# Patient Record
Sex: MALE | Race: OTHER | HISPANIC OR LATINO | Employment: FULL TIME | URBAN - METROPOLITAN AREA
[De-identification: names, ages, dates, MRNs, and addresses within clinical notes are randomized per-mention and may not be internally consistent; named-entity substitution may affect disease eponyms.]

---

## 2024-12-17 ENCOUNTER — APPOINTMENT (OUTPATIENT)
Dept: URGENT CARE | Age: 59
End: 2024-12-17
Payer: COMMERCIAL

## 2024-12-17 ENCOUNTER — OFFICE VISIT (OUTPATIENT)
Dept: URGENT CARE | Age: 59
End: 2024-12-17
Payer: COMMERCIAL

## 2024-12-17 VITALS
OXYGEN SATURATION: 99 % | WEIGHT: 169 LBS | RESPIRATION RATE: 16 BRPM | SYSTOLIC BLOOD PRESSURE: 131 MMHG | DIASTOLIC BLOOD PRESSURE: 93 MMHG | BODY MASS INDEX: 28.12 KG/M2 | TEMPERATURE: 97.8 F | HEART RATE: 80 BPM

## 2024-12-17 DIAGNOSIS — R11.2 NAUSEA AND VOMITING, UNSPECIFIED VOMITING TYPE: Primary | ICD-10-CM

## 2024-12-17 PROCEDURE — 99213 OFFICE O/P EST LOW 20 MIN: CPT | Performed by: STUDENT IN AN ORGANIZED HEALTH CARE EDUCATION/TRAINING PROGRAM

## 2024-12-17 RX ORDER — ONDANSETRON 4 MG/1
8 TABLET, FILM COATED ORAL EVERY 8 HOURS PRN
Qty: 15 TABLET | Refills: 0 | Status: SHIPPED | OUTPATIENT
Start: 2024-12-17 | End: 2024-12-22

## 2024-12-17 ASSESSMENT — ENCOUNTER SYMPTOMS
DIARRHEA: 0
CONSTITUTIONAL NEGATIVE: 1
NAUSEA: 1
CARDIOVASCULAR NEGATIVE: 1
RESPIRATORY NEGATIVE: 1
VOMITING: 1

## 2024-12-17 ASSESSMENT — PAIN SCALES - GENERAL: PAINLEVEL_OUTOF10: 6

## 2024-12-17 NOTE — PROGRESS NOTES
Subjective   Patient ID: Jamel Harrison is a 58 y.o. male. They present today with a chief complaint of Vomiting (N/V X 3 days. ).    History of Present Illness    Vomiting  Associated symptoms: no diarrhea      Patient presents to urgent care for nausea and vomiting over the last 3 days, no recorded fever, no report of diarrhea.  Multiple family members with similar symptoms, no report of blood in emesis, declined COVID and flu testing    past Medical History  Allergies as of 12/17/2024    (No Known Allergies)       (Not in a hospital admission)       History reviewed. No pertinent past medical history.    History reviewed. No pertinent surgical history.         Review of Systems  Review of Systems   Constitutional: Negative.    HENT: Negative.     Respiratory: Negative.     Cardiovascular: Negative.    Gastrointestinal:  Positive for nausea and vomiting. Negative for diarrhea.                                  Objective    Vitals:    12/17/24 1119   BP: (!) 131/93   Pulse: 80   Resp: 16   Temp: 36.6 °C (97.8 °F)   SpO2: 99%   Weight: 76.7 kg (169 lb)     No LMP for male patient.    Physical Exam  Vitals and nursing note reviewed.   Constitutional:       General: He is not in acute distress.     Appearance: Normal appearance. He is not ill-appearing, toxic-appearing or diaphoretic.   Cardiovascular:      Rate and Rhythm: Normal rate and regular rhythm.      Pulses: Normal pulses.      Heart sounds: Normal heart sounds.   Pulmonary:      Effort: Pulmonary effort is normal.      Breath sounds: Normal breath sounds.   Abdominal:      General: Abdomen is flat. Bowel sounds are normal. There is no distension.      Palpations: Abdomen is soft. There is no mass.      Tenderness: There is no abdominal tenderness. There is no guarding or rebound.      Hernia: No hernia is present.   Skin:     Findings: No rash.   Neurological:      General: No focal deficit present.      Mental Status: He is alert and oriented to person,  place, and time.   Psychiatric:         Mood and Affect: Mood normal.         Behavior: Behavior normal.         Procedures    Point of Care Test & Imaging Results from this visit  No results found for this visit on 12/17/24.   No results found.    Diagnostic study results (if any) were reviewed by Trevon Sutherland PA-C.    Assessment/Plan   Allergies, medications, history, and pertinent labs/EKGs/Imaging reviewed by Trevon Sutherland PA-C.     Medical Decision Making  I did discuss with patient may be viral etiology or possible food poisoning as reports that everyone in the home who has symptoms also ate [employer] chicken.  Will place on Zofran for symptomology, if worsening symptoms patient is to go to the ED.  Patient given note for work    Orders and Diagnoses  Diagnoses and all orders for this visit:  Nausea and vomiting, unspecified vomiting type  -     ondansetron (Zofran) 4 mg tablet; Take 2 tablets (8 mg) by mouth every 8 hours if needed for nausea or vomiting for up to 5 days.      Medical Admin Record      Patient disposition: Home please take medication as prescribed.  If no resolution regression of symptoms in 48 hours or if worsening symptoms or unable to keep in fluids please go to the emergency room    Electronically signed by Trevon Sutherland PA-C  11:32 AM

## 2024-12-17 NOTE — PATIENT INSTRUCTIONS
lease take medication as prescribed.  If no resolution regression of symptoms in 48 hours or if worsening symptoms or unable to keep in fluids please go to the emergency room

## 2024-12-17 NOTE — LETTER
December 17, 2024     Patient: Jamel Harrison   YOB: 1965   Date of Visit: 12/17/2024       To Whom It May Concern:    Jamel Harrison was seen in my clinic on 12/17/2024 at 11:15 am. Please excuse Jamel for his absence from work on this day to make the appointment.  -Patient seen and evaluated in office on 12/17/2024, please excuse absences from work of 12/17/2024 through 12/21/2024 due to condition, patient may return early if tolerable  If you have any questions or concerns, please don't hesitate to call.         Sincerely,         Trevon Sutherland PA-C        CC: No Recipients